# Patient Record
Sex: FEMALE | Race: WHITE | NOT HISPANIC OR LATINO | Employment: FULL TIME | URBAN - NONMETROPOLITAN AREA
[De-identification: names, ages, dates, MRNs, and addresses within clinical notes are randomized per-mention and may not be internally consistent; named-entity substitution may affect disease eponyms.]

---

## 2023-10-03 PROBLEM — R92.8 ABNORMAL MAMMOGRAM: Status: ACTIVE | Noted: 2023-10-03

## 2023-10-03 PROBLEM — R10.2 PELVIC PAIN IN FEMALE: Status: ACTIVE | Noted: 2023-10-03

## 2023-10-03 RX ORDER — LEVOTHYROXINE SODIUM 75 UG/1
TABLET ORAL
COMMUNITY
Start: 2020-02-24

## 2023-10-05 ENCOUNTER — PREP FOR PROCEDURE (OUTPATIENT)
Dept: SURGERY | Facility: HOSPITAL | Age: 53
End: 2023-10-05

## 2023-10-05 ENCOUNTER — OFFICE VISIT (OUTPATIENT)
Dept: SURGERY | Facility: CLINIC | Age: 53
End: 2023-10-05
Payer: COMMERCIAL

## 2023-10-05 VITALS
SYSTOLIC BLOOD PRESSURE: 122 MMHG | BODY MASS INDEX: 28.68 KG/M2 | HEART RATE: 81 BPM | WEIGHT: 178.47 LBS | HEIGHT: 66 IN | DIASTOLIC BLOOD PRESSURE: 74 MMHG

## 2023-10-05 DIAGNOSIS — Z12.11 COLON CANCER SCREENING: Primary | ICD-10-CM

## 2023-10-05 PROCEDURE — 1036F TOBACCO NON-USER: CPT | Performed by: SURGERY

## 2023-10-05 PROCEDURE — 99243 OFF/OP CNSLTJ NEW/EST LOW 30: CPT | Performed by: SURGERY

## 2023-10-05 RX ORDER — ONDANSETRON HYDROCHLORIDE 2 MG/ML
4 INJECTION, SOLUTION INTRAVENOUS ONCE AS NEEDED
Status: CANCELLED | OUTPATIENT
Start: 2023-10-05

## 2023-10-05 RX ORDER — SODIUM CHLORIDE 9 MG/ML
100 INJECTION, SOLUTION INTRAVENOUS CONTINUOUS
Status: CANCELLED | OUTPATIENT
Start: 2023-10-05

## 2023-10-05 NOTE — LETTER
2023     Teddy Huynh MD  [address] [facility] [address]    Patient: Nilda Bonner   YOB: 1970   Date of Visit: 10/5/2023       Dear Dr. Teddy Huynh MD:    Thank you for referring Nilda Bonner to me for evaluation. Below are my notes for this consultation.  If you have questions, please do not hesitate to call me. I look forward to following your patient along with you.       Sincerely,     Gerri Wilde MD      CC: No Recipients  ______________________________________________________________________________________    General Surgery Consultation    Patient: Nilda Bonner  : 1970  MRN: [id number]  Date of Consultation: 10/05/23    Referring Primary Care Provider: Teddy Huynh MD    Chief Complaint: Colon cancer screening    History of Present Illness: Nilda Bonner is a 53 y.o. old female seen at the request of Dr. Huynh for evaluation for colonoscopy.  She had a colonoscopy back in .  At that time, she was occasionally seeing blood per rectum.  To her knowledge, hemorrhoids were found but no other abnormalities.  However, it was an incomplete colonoscopy.  She has daily bowel movements.  These are soft and do not require straining.  She is not on any stool softeners, fiber supplements, or laxatives.  She denies seeing blood in the stool or dark black bowel movements.  She is not on any blood thinners or antiplatelet agents.  She has no family history of colon or rectal cancer.    Medical History:  Vision issues    Surgical History:       Home Medications:  Prior to Admission medications    Medication Sig Start Date End Date Taking? Authorizing Provider   levothyroxine (Synthroid, Levoxyl) 75 mcg tablet Levothyroxine Sodium 75 MCG Oral Tablet   Quantity: 90  Refills: 0        Start : 2020   Active 20   Historical Provider, MD       Allergies:  No Known Allergies    Family History:  "  No family history of colon or rectal cancer.  HTN (mother)  Diabetes (sister)    Social History:  Rare alcohol use, 2-3 times per year.  Non-smoker.  No drug use.    ROS:  Constitutional:  no fever, sweats, and chills  Cardiovascular: No chest pain  Respiratory: No cough or shortness of breath  Gastrointestinal: No change in bowel habits.  No blood in the stool.  No abdominal pain.  Genitourinary: no dysuria  Musculoskeletal: + Joint pain/stiffness, \"my hip is bothering me\"  Integumentary: no rashes  Neurological: no confusion  Endocrine: no heat or cold intolerance  Heme/Lymph: no easy bruising or bleeding    Objective:  /74   Pulse 81   Ht 1.676 m (5' 6\")   Wt 81 kg (178 lb 7.5 oz)   BMI 28.81 kg/m²     Physical Exam:  Constitutional: No acute distress, conversant, pleasant  Neurologic: alert and oriented  Psych: appropriate affect  Ears, Nose, Mouth and Throat: mucus membranes moist  Pulmonary: No labored breathing  Cardiovascular: Regular rate and rhythm  Abdomen: Nondistended, BMI 29  Musculoskeletal: Moves all extremities, no edema  Skin: No jaundice    Labs/imaging:  No pertinent labs or imaging available for review.    Assessment and Plan: Nilda Bonner is a 53 y.o. old female in need of colon cancer screening.  We discussed risks and benefit of colonoscopy.  This included risks of bleeding, perforation, missed polyps, incomplete colonoscopy, and potential need for additional procedures pending findings.  She was agreeable to proceed.  Bowel prep instructions were reviewed with the patient and all of her questions were answered.  She is scheduled for screening colonoscopy on 10/9/23.     Gerri Wilde MD  10/5/2023  "

## 2023-10-05 NOTE — H&P (VIEW-ONLY)
"General Surgery Consultation    Patient: Nilda Bonner  : 1970  MRN: [id number]  Date of Consultation: 10/05/23    Referring Primary Care Provider: Teddy Huynh MD    Chief Complaint: Colon cancer screening    History of Present Illness: Nilda Bonner is a 53 y.o. old female seen at the request of Dr. Huynh for evaluation for colonoscopy.  She had a colonoscopy back in .  At that time, she was occasionally seeing blood per rectum.  To her knowledge, hemorrhoids were found but no other abnormalities.  However, it was an incomplete colonoscopy.  She has daily bowel movements.  These are soft and do not require straining.  She is not on any stool softeners, fiber supplements, or laxatives.  She denies seeing blood in the stool or dark black bowel movements.  She is not on any blood thinners or antiplatelet agents.  She has no family history of colon or rectal cancer.    Medical History:  Vision issues    Surgical History:       Home Medications:  Prior to Admission medications    Medication Sig Start Date End Date Taking? Authorizing Provider   levothyroxine (Synthroid, Levoxyl) 75 mcg tablet Levothyroxine Sodium 75 MCG Oral Tablet   Quantity: 90  Refills: 0        Start : 2020   Active 20   Historical Provider, MD       Allergies:  No Known Allergies    Family History:   No family history of colon or rectal cancer.  HTN (mother)  Diabetes (sister)    Social History:  Rare alcohol use, 2-3 times per year.  Non-smoker.  No drug use.    ROS:  Constitutional:  no fever, sweats, and chills  Cardiovascular: No chest pain  Respiratory: No cough or shortness of breath  Gastrointestinal: No change in bowel habits.  No blood in the stool.  No abdominal pain.  Genitourinary: no dysuria  Musculoskeletal: + Joint pain/stiffness, \"my hip is bothering me\"  Integumentary: no rashes  Neurological: no confusion  Endocrine: no heat or cold intolerance  Heme/Lymph: no easy bruising or " "bleeding    Objective:  /74   Pulse 81   Ht 1.676 m (5' 6\")   Wt 81 kg (178 lb 7.5 oz)   BMI 28.81 kg/m²     Physical Exam:  Constitutional: No acute distress, conversant, pleasant  Neurologic: alert and oriented  Psych: appropriate affect  Ears, Nose, Mouth and Throat: mucus membranes moist  Pulmonary: No labored breathing  Cardiovascular: Regular rate and rhythm  Abdomen: Nondistended, BMI 29  Musculoskeletal: Moves all extremities, no edema  Skin: No jaundice    Labs/imaging:  No pertinent labs or imaging available for review.    Assessment and Plan: Nilda Bonner is a 53 y.o. old female in need of colon cancer screening.  We discussed risks and benefit of colonoscopy.  This included risks of bleeding, perforation, missed polyps, incomplete colonoscopy, and potential need for additional procedures pending findings.  She was agreeable to proceed.  Bowel prep instructions were reviewed with the patient and all of her questions were answered.  She is scheduled for screening colonoscopy on 10/9/23.     Gerri Wilde MD  10/5/2023  "

## 2023-10-05 NOTE — LETTER
2023     Teddy Huynh MD  [address] [facility] [address]    Patient: Nilda Bonner   YOB: 1970   Date of Visit: 10/5/2023       Dear Dr. Teddy Huynh MD:    Thank you for referring Nilda Bonner to me for evaluation. Below are my notes for this consultation.  If you have questions, please do not hesitate to call me. I look forward to following your patient along with you.       Sincerely,     Gerri Wilde MD      CC: No Recipients  ______________________________________________________________________________________    General Surgery Consultation    Patient: Nilda Bonner  : 1970  MRN: [id number]  Date of Consultation: 10/05/23    Referring Primary Care Provider: Teddy Huynh MD    Chief Complaint: Colon cancer screening    History of Present Illness: Nilda Bonner is a 53 y.o. old female seen at the request of Dr. Huynh for evaluation for colonoscopy.     Medical History:  ***    Surgical History:       Home Medications:  Prior to Admission medications    Medication Sig Start Date End Date Taking? Authorizing Provider   levothyroxine (Synthroid, Levoxyl) 75 mcg tablet Levothyroxine Sodium 75 MCG Oral Tablet   Quantity: 90  Refills: 0        Start : 2020   Active 20   Historical Provider, MD       Allergies:  No Known Allergies    Family History:   HTN (mother)  Diabetes (sister)    Social History:  ***    ROS:  Constitutional:  no fever, sweats, and chills  Cardiovascular: No chest pain  Respiratory: No cough or shortness of breath  Gastrointestinal: ***  Genitourinary: no dysuria  Musculoskeletal: no weakness or swelling  Integumentary: no rashes  Neurological: no confusion  Endocrine: no heat or cold intolerance  Heme/Lymph: no easy bruising or bleeding    Objective:  There were no vitals taken for this visit.    Physical Exam:  Constitutional: No acute distress, conversant,  pleasant  Neurologic: alert and oriented  Psych: appropriate affect  Ears, Nose, Mouth and Throat: mucus membranes moist  Pulmonary: No labored breathing  Cardiovascular: Regular rate and rhythm  Abdomen: soft, non-distended, non-tender***  Musculoskeletal: Moves all extremities, no edema  Skin: warm and dry    Labs/imaging:  No pertinent labs or imaging available for review.    Assessment and Plan: Nilda Bonner is a 53 y.o. old female in need of colon cancer screening.  We discussed risks and benefit of colonoscopy.  This included risks of bleeding, perforation, missed polyps, incomplete colonoscopy, and potential need for additional procedures pending findings.  She was agreeable to proceed.  Bowel prep instructions were reviewed with the patient and all of her questions were answered.  She is scheduled for screening colonoscopy on ***.     Gerri Wlide MD  10/5/2023

## 2023-10-05 NOTE — PROGRESS NOTES
"General Surgery Consultation    Patient: Nilda Bonner  : 1970  MRN: [id number]  Date of Consultation: 10/05/23    Referring Primary Care Provider: Teddy Huynh MD    Chief Complaint: Colon cancer screening    History of Present Illness: Nilda Bonner is a 53 y.o. old female seen at the request of Dr. Huynh for evaluation for colonoscopy.  She had a colonoscopy back in .  At that time, she was occasionally seeing blood per rectum.  To her knowledge, hemorrhoids were found but no other abnormalities.  However, it was an incomplete colonoscopy.  She has daily bowel movements.  These are soft and do not require straining.  She is not on any stool softeners, fiber supplements, or laxatives.  She denies seeing blood in the stool or dark black bowel movements.  She is not on any blood thinners or antiplatelet agents.  She has no family history of colon or rectal cancer.    Medical History:  Vision issues    Surgical History:       Home Medications:  Prior to Admission medications    Medication Sig Start Date End Date Taking? Authorizing Provider   levothyroxine (Synthroid, Levoxyl) 75 mcg tablet Levothyroxine Sodium 75 MCG Oral Tablet   Quantity: 90  Refills: 0        Start : 2020   Active 20   Historical Provider, MD       Allergies:  No Known Allergies    Family History:   No family history of colon or rectal cancer.  HTN (mother)  Diabetes (sister)    Social History:  Rare alcohol use, 2-3 times per year.  Non-smoker.  No drug use.    ROS:  Constitutional:  no fever, sweats, and chills  Cardiovascular: No chest pain  Respiratory: No cough or shortness of breath  Gastrointestinal: No change in bowel habits.  No blood in the stool.  No abdominal pain.  Genitourinary: no dysuria  Musculoskeletal: + Joint pain/stiffness, \"my hip is bothering me\"  Integumentary: no rashes  Neurological: no confusion  Endocrine: no heat or cold intolerance  Heme/Lymph: no easy bruising or " "bleeding    Objective:  /74   Pulse 81   Ht 1.676 m (5' 6\")   Wt 81 kg (178 lb 7.5 oz)   BMI 28.81 kg/m²     Physical Exam:  Constitutional: No acute distress, conversant, pleasant  Neurologic: alert and oriented  Psych: appropriate affect  Ears, Nose, Mouth and Throat: mucus membranes moist  Pulmonary: No labored breathing  Cardiovascular: Regular rate and rhythm  Abdomen: Nondistended, BMI 29  Musculoskeletal: Moves all extremities, no edema  Skin: No jaundice    Labs/imaging:  No pertinent labs or imaging available for review.    Assessment and Plan: Nilda Bonner is a 53 y.o. old female in need of colon cancer screening.  We discussed risks and benefit of colonoscopy.  This included risks of bleeding, perforation, missed polyps, incomplete colonoscopy, and potential need for additional procedures pending findings.  She was agreeable to proceed.  Bowel prep instructions were reviewed with the patient and all of her questions were answered.  She is scheduled for screening colonoscopy on 10/9/23.     Gerri Wilde MD  10/5/2023  "

## 2023-10-09 ENCOUNTER — HOSPITAL ENCOUNTER (OUTPATIENT)
Dept: GASTROENTEROLOGY | Facility: CLINIC | Age: 53
Setting detail: OUTPATIENT SURGERY
Discharge: HOME | End: 2023-10-09
Payer: COMMERCIAL

## 2023-10-09 VITALS
BODY MASS INDEX: 27.88 KG/M2 | TEMPERATURE: 97.4 F | OXYGEN SATURATION: 97 % | WEIGHT: 173.5 LBS | HEART RATE: 85 BPM | DIASTOLIC BLOOD PRESSURE: 67 MMHG | SYSTOLIC BLOOD PRESSURE: 103 MMHG | HEIGHT: 66 IN | RESPIRATION RATE: 16 BRPM

## 2023-10-09 DIAGNOSIS — Z12.11 COLON CANCER SCREENING: ICD-10-CM

## 2023-10-09 PROCEDURE — 99153 MOD SED SAME PHYS/QHP EA: CPT | Performed by: SURGERY

## 2023-10-09 PROCEDURE — 7100000009 HC PHASE TWO TIME - INITIAL BASE CHARGE

## 2023-10-09 PROCEDURE — G0121 COLON CA SCRN NOT HI RSK IND: HCPCS | Performed by: SURGERY

## 2023-10-09 PROCEDURE — 7100000010 HC PHASE TWO TIME - EACH INCREMENTAL 1 MINUTE

## 2023-10-09 PROCEDURE — 3700000012 HC SEDATION LEVEL 5+ TIME - INITIAL 15 MINUTES 5/> YEARS

## 2023-10-09 PROCEDURE — 3700000013 HC SEDATION LEVEL 5+ TIME - EACH ADDITIONAL 15 MINUTES

## 2023-10-09 PROCEDURE — 99152 MOD SED SAME PHYS/QHP 5/>YRS: CPT | Performed by: SURGERY

## 2023-10-09 PROCEDURE — 45378 DIAGNOSTIC COLONOSCOPY: CPT | Performed by: SURGERY

## 2023-10-09 PROCEDURE — 2500000004 HC RX 250 GENERAL PHARMACY W/ HCPCS (ALT 636 FOR OP/ED): Performed by: SURGERY

## 2023-10-09 RX ORDER — SODIUM CHLORIDE 9 MG/ML
100 INJECTION, SOLUTION INTRAVENOUS CONTINUOUS
Status: DISCONTINUED | OUTPATIENT
Start: 2023-10-09 | End: 2023-10-20 | Stop reason: HOSPADM

## 2023-10-09 RX ORDER — ACETAMINOPHEN 325 MG/1
325 TABLET ORAL EVERY 6 HOURS PRN
COMMUNITY
End: 2023-11-22 | Stop reason: ALTCHOICE

## 2023-10-09 RX ORDER — FENTANYL CITRATE 50 UG/ML
INJECTION, SOLUTION INTRAMUSCULAR; INTRAVENOUS AS NEEDED
Status: COMPLETED | OUTPATIENT
Start: 2023-10-09 | End: 2023-10-09

## 2023-10-09 RX ORDER — MIDAZOLAM HYDROCHLORIDE 1 MG/ML
INJECTION, SOLUTION INTRAMUSCULAR; INTRAVENOUS AS NEEDED
Status: COMPLETED | OUTPATIENT
Start: 2023-10-09 | End: 2023-10-09

## 2023-10-09 RX ADMIN — FENTANYL CITRATE 25 MCG: 50 INJECTION, SOLUTION INTRAMUSCULAR; INTRAVENOUS at 09:35

## 2023-10-09 RX ADMIN — FENTANYL CITRATE 50 MCG: 50 INJECTION, SOLUTION INTRAMUSCULAR; INTRAVENOUS at 09:27

## 2023-10-09 RX ADMIN — MIDAZOLAM 2 MG: 1 INJECTION INTRAMUSCULAR; INTRAVENOUS at 09:46

## 2023-10-09 RX ADMIN — GLUCAGON HYDROCHLORIDE 1 MG: KIT at 09:29

## 2023-10-09 RX ADMIN — MIDAZOLAM 2 MG: 1 INJECTION INTRAMUSCULAR; INTRAVENOUS at 09:41

## 2023-10-09 RX ADMIN — SODIUM CHLORIDE 100 ML/HR: 9 INJECTION, SOLUTION INTRAVENOUS at 09:15

## 2023-10-09 RX ADMIN — MIDAZOLAM 2 MG: 1 INJECTION INTRAMUSCULAR; INTRAVENOUS at 09:31

## 2023-10-09 RX ADMIN — FENTANYL CITRATE 25 MCG: 50 INJECTION, SOLUTION INTRAMUSCULAR; INTRAVENOUS at 09:33

## 2023-10-09 RX ADMIN — MIDAZOLAM 4 MG: 1 INJECTION INTRAMUSCULAR; INTRAVENOUS at 09:28

## 2023-10-09 ASSESSMENT — PAIN SCALES - GENERAL
PAINLEVEL_OUTOF10: 0 - NO PAIN
PAINLEVEL_OUTOF10: 1
PAINLEVEL_OUTOF10: 3
PAINLEVEL_OUTOF10: 0 - NO PAIN
PAINLEVEL_OUTOF10: 4
PAINLEVEL_OUTOF10: 0 - NO PAIN
PAINLEVEL_OUTOF10: 2
PAINLEVEL_OUTOF10: 0 - NO PAIN
PAINLEVEL_OUTOF10: 3
PAINLEVEL_OUTOF10: 0 - NO PAIN
PAINLEVEL_OUTOF10: 0 - NO PAIN

## 2023-10-09 ASSESSMENT — PAIN - FUNCTIONAL ASSESSMENT: PAIN_FUNCTIONAL_ASSESSMENT: 0-10

## 2023-10-09 NOTE — DISCHARGE INSTRUCTIONS
No driving for 24 hours.  No alcohol for 24 hours.  Do not make any important decisions, or sign any legal documents for 24 hours.      Soft diet today.  May resume regular diet after today.

## 2023-10-10 ENCOUNTER — TELEPHONE (OUTPATIENT)
Dept: SURGERY | Facility: CLINIC | Age: 53
End: 2023-10-10
Payer: COMMERCIAL

## 2023-11-22 ENCOUNTER — OFFICE VISIT (OUTPATIENT)
Dept: OBSTETRICS AND GYNECOLOGY | Facility: CLINIC | Age: 53
End: 2023-11-22
Payer: COMMERCIAL

## 2023-11-22 VITALS
DIASTOLIC BLOOD PRESSURE: 78 MMHG | HEIGHT: 65 IN | SYSTOLIC BLOOD PRESSURE: 118 MMHG | BODY MASS INDEX: 29.76 KG/M2 | WEIGHT: 178.6 LBS

## 2023-11-22 DIAGNOSIS — Z12.4 PAP SMEAR FOR CERVICAL CANCER SCREENING: ICD-10-CM

## 2023-11-22 DIAGNOSIS — Z12.31 ENCOUNTER FOR SCREENING MAMMOGRAM FOR MALIGNANT NEOPLASM OF BREAST: ICD-10-CM

## 2023-11-22 DIAGNOSIS — Z01.419 ENCOUNTER FOR GYNECOLOGICAL EXAMINATION WITHOUT ABNORMAL FINDING: ICD-10-CM

## 2023-11-22 PROCEDURE — 1036F TOBACCO NON-USER: CPT | Performed by: OBSTETRICS & GYNECOLOGY

## 2023-11-22 PROCEDURE — 88175 CYTOPATH C/V AUTO FLUID REDO: CPT

## 2023-11-22 PROCEDURE — 99396 PREV VISIT EST AGE 40-64: CPT | Performed by: OBSTETRICS & GYNECOLOGY

## 2023-11-22 NOTE — PROGRESS NOTES
Nilda Bonner is a 53 y.o. female who is here for a routine exam. PCP = Teddy Huynh MD    Chief Complaint   Patient presents with    Gynecologic Exam     Patient is here for a yearly exam. Patient denies self breast exams. Patient has no concenrs. LMP 2023.        Presents for annual exam. She voices no complaints and is doing well. Denies any bowel or bladder problems. Denies any breast problems.   had a vasectomy    OB History          3    Para   2    Term   1       1    AB   1    Living   2         SAB   1    IAB   0    Ectopic   0    Multiple   0    Live Births   2                  Social History     Substance and Sexual Activity   Sexual Activity Not on file     Current contraception: Vasectomy    Past Medical History:   Diagnosis Date    Encounter for  delivery without indication     Delivery of pregnancy by  section    Encounter for gynecological examination (general) (routine) without abnormal findings     Pap test, as part of routine gynecological examination    Other conditions influencing health status     Menstruation    Personal history of other complications of pregnancy, childbirth and the puerperium     History of     Personal history of other medical treatment     History of mammogram       Past Surgical History:   Procedure Laterality Date    COLONOSCOPY  10/09/2023    Repeat in 10 years, performed by Dr. Wilde    OTHER SURGICAL HISTORY  2020     section       Past med hx and past surg hx reviewed and notable for: none    Review of Systems:   Constitutional: No fever or chills  Respiratory: No shortness of breath, or cough  Cardiovascular: No chest pain or syncope  Breasts: No breast pain, no masses, no nipple discharge  Gastrointestinal: No nausea, vomiting, or diarrhea, no abdominal pain  Genitourinary: No dysuria or frequency  Gynecology: Negative except as noted in history of present illness  All other: All other  "systems reviewed and negative for complaint    Objective   /78   Ht 1.651 m (5' 5\")   Wt 81 kg (178 lb 9.6 oz)   LMP 11/12/2023 (Exact Date)   BMI 29.72 kg/m²     PHYSICAL EXAMINATION:  Well-developed, well nourished, in no acute distress, alert and oriented x three, is pleasant and cooperative.   HEENT: Clear. Pupils equal, round and reactive to light and accommodation. Extraocular muscles are intact. Oral mucosa pink without exudate.   NECK: No lymphadenopathy, no thyromegaly.  BREASTS: Symmetric, no palpable masses. No nipple discharge or retraction.  LUNGS: Clear bilaterally.  HEART: Regular rate and rhythm without murmurs.  ABDOMEN: Normoactive bowel sounds, soft and nontender, no guarding or rebound tenderness, no CVA tenderness.  EXTREMITIES: No clubbing, cyanosis or edema.  NEUROLOGIC:  Cranial nerves II-XII grossly intact.  :  Normal external female genitalia, normal vulva, normal vagina. Normal urethral meatus, urethra and bladder. Normal appearing cervix. Normal-sized uterus, no adnexal masses or tenderness. Pap smear performed today.      Actions performed during this visit include:  - Clinical breast exam  - Clinical pelvic exam  -   Orders Placed This Encounter   Procedures    BI mammo bilateral screening tomosynthesis     Standing Status:   Future     Standing Expiration Date:   1/22/2025     Order Specific Question:   Is the patient pregnant?     Answer:   No     Order Specific Question:   Previous Mamm performed at  location?     Answer:   Yes     Order Specific Question:   Reason for exam:     Answer:   Yearly Screening     Order Specific Question:   Did the patient have a similar exam previously at a location outside of ?     Answer:   No     Order Specific Question:   Radiologist to Determine Optimal Study     Answer:   Yes     Order Specific Question:   Release result to [email or web address]     Answer:   Immediate [1]     Order Specific Question:   Is this exam part of a Research Study? If " Yes, link this order to the research study     Answer:   No        Problem List Items Addressed This Visit    None  Visit Diagnoses       Encounter for gynecological examination without abnormal finding        Relevant Orders    THINPREP PAP    Pap smear for cervical cancer screening        Relevant Orders    THINPREP PAP    Encounter for screening mammogram for malignant neoplasm of breast        Relevant Orders    BI mammo bilateral screening tomosynthesis             Provider Impression:  1.  Annual  2.  Screening mammogram    Thank you for coming to your annual exam. Your findings during the exam were normal.  Please return for your next visit in 1 year.

## 2023-12-09 LAB
CYTOLOGY CMNT CVX/VAG CYTO-IMP: NORMAL
LAB AP HPV GENOTYPE QUESTION: YES
LAB AP HPV HR: NORMAL
LABORATORY COMMENT REPORT: NORMAL
LMP START DATE: NORMAL
PATH REPORT.TOTAL CANCER: NORMAL

## 2023-12-11 ENCOUNTER — TELEPHONE (OUTPATIENT)
Dept: OBSTETRICS AND GYNECOLOGY | Facility: CLINIC | Age: 53
End: 2023-12-11
Payer: COMMERCIAL

## 2023-12-11 NOTE — TELEPHONE ENCOUNTER
----- Message from Boni Tapia MD sent at 12/11/2023  6:52 AM EST -----  Please inform patient of the normal Pap

## 2023-12-27 ENCOUNTER — ANCILLARY PROCEDURE (OUTPATIENT)
Dept: RADIOLOGY | Facility: CLINIC | Age: 53
End: 2023-12-27
Payer: COMMERCIAL

## 2023-12-27 DIAGNOSIS — Z12.31 ENCOUNTER FOR SCREENING MAMMOGRAM FOR MALIGNANT NEOPLASM OF BREAST: ICD-10-CM

## 2023-12-27 PROCEDURE — 77067 SCR MAMMO BI INCL CAD: CPT | Performed by: RADIOLOGY

## 2023-12-27 PROCEDURE — 77067 SCR MAMMO BI INCL CAD: CPT

## 2023-12-27 PROCEDURE — 77063 BREAST TOMOSYNTHESIS BI: CPT | Performed by: RADIOLOGY

## 2024-04-19 ENCOUNTER — TELEPHONE (OUTPATIENT)
Dept: OBSTETRICS AND GYNECOLOGY | Facility: CLINIC | Age: 54
End: 2024-04-19
Payer: COMMERCIAL

## 2024-04-19 NOTE — TELEPHONE ENCOUNTER
Patient called and left a message stating she saw her NP at her PCP office and they told her that her iron is low and was told to call us to let us know that she is having a period every 2 weeks.

## 2024-04-24 ENCOUNTER — OFFICE VISIT (OUTPATIENT)
Dept: OBSTETRICS AND GYNECOLOGY | Facility: CLINIC | Age: 54
End: 2024-04-24
Payer: COMMERCIAL

## 2024-04-24 VITALS
BODY MASS INDEX: 30.49 KG/M2 | WEIGHT: 183 LBS | DIASTOLIC BLOOD PRESSURE: 76 MMHG | SYSTOLIC BLOOD PRESSURE: 118 MMHG | HEIGHT: 65 IN

## 2024-04-24 DIAGNOSIS — N93.9 ABNORMAL UTERINE BLEEDING (AUB): ICD-10-CM

## 2024-04-24 PROCEDURE — 1036F TOBACCO NON-USER: CPT | Performed by: OBSTETRICS & GYNECOLOGY

## 2024-04-24 PROCEDURE — 58100 BIOPSY OF UTERUS LINING: CPT | Performed by: OBSTETRICS & GYNECOLOGY

## 2024-04-24 PROCEDURE — 88305 TISSUE EXAM BY PATHOLOGIST: CPT

## 2024-04-24 PROCEDURE — 88305 TISSUE EXAM BY PATHOLOGIST: CPT | Performed by: STUDENT IN AN ORGANIZED HEALTH CARE EDUCATION/TRAINING PROGRAM

## 2024-04-24 PROCEDURE — 99213 OFFICE O/P EST LOW 20 MIN: CPT | Performed by: OBSTETRICS & GYNECOLOGY

## 2024-04-24 RX ORDER — CHOLECALCIFEROL (VITAMIN D3) 50 MCG
TABLET ORAL
COMMUNITY
Start: 2024-04-18

## 2024-04-24 RX ORDER — FERROUS SULFATE TAB 325 MG (65 MG ELEMENTAL FE) 325 (65 FE) MG
325 TAB ORAL
COMMUNITY
Start: 2024-04-18

## 2024-05-03 LAB
LABORATORY COMMENT REPORT: NORMAL
PATH REPORT.FINAL DX SPEC: NORMAL
PATH REPORT.GROSS SPEC: NORMAL
PATH REPORT.RELEVANT HX SPEC: NORMAL
PATH REPORT.TOTAL CANCER: NORMAL

## 2024-05-07 ENCOUNTER — OFFICE VISIT (OUTPATIENT)
Dept: OBSTETRICS AND GYNECOLOGY | Facility: CLINIC | Age: 54
End: 2024-05-07
Payer: COMMERCIAL

## 2024-05-07 VITALS — HEIGHT: 65 IN | WEIGHT: 187.4 LBS | BODY MASS INDEX: 31.22 KG/M2

## 2024-05-07 DIAGNOSIS — N93.9 ABNORMAL UTERINE BLEEDING (AUB): Primary | ICD-10-CM

## 2024-05-07 PROCEDURE — 99213 OFFICE O/P EST LOW 20 MIN: CPT | Performed by: OBSTETRICS & GYNECOLOGY

## 2024-05-07 PROCEDURE — 1036F TOBACCO NON-USER: CPT | Performed by: OBSTETRICS & GYNECOLOGY

## 2024-05-07 ASSESSMENT — PAIN SCALES - GENERAL: PAINLEVEL: 0-NO PAIN

## 2024-05-07 NOTE — PROGRESS NOTES
Nilda Bonner is a 53 y.o. year old female patient.  PCP = Teddy Huynh MD    Chief Complaint   Patient presents with    Results     Patient is here to go over EMB results.        HPI   Presents to review recent endometrial biopsy due to history of abnormal uterine bleeding.   had a vasectomy.    OB History          3    Para   2    Term   1       1    AB   1    Living   2         SAB   1    IAB   0    Ectopic   0    Multiple   0    Live Births   2                 Past Medical History:   Diagnosis Date    Encounter for  delivery without indication ([facility])     Delivery of pregnancy by  section    Encounter for gynecological examination (general) (routine) without abnormal findings     Pap test, as part of routine gynecological examination    Other conditions influencing health status     Menstruation    Personal history of other complications of pregnancy, childbirth and the puerperium     History of     Personal history of other medical treatment     History of mammogram       Past Surgical History:   Procedure Laterality Date    COLONOSCOPY  10/09/2023    Repeat in 10 years, performed by Dr. Wilde    OTHER SURGICAL HISTORY  2020     section       Review of Systems:   Constitutional: No fever or chills  Respiratory: No shortness of breath, or cough  Cardiovascular: No chest pain or syncope  Breasts: No breast pain, no masses, no nipple discharge  Gastrointestinal: No nausea, vomiting, or diarrhea, no abdominal pain  Genitourinary: No dysuria or frequency  Gynecology: Negative except as noted in history of present illness  All other: All other systems reviewed and negative for complaint    Medication Documentation Review Audit       Reviewed by Boni Tapia MD (Physician) on 24 at 1006      Medication Order Taking? Sig Documenting Provider Last Dose Status   cholecalciferol (Vitamin D-3) 50 MCG (2000) tablet [id number]  TAKE 1 BY  "MOUTH EVERY DAY Historical Provider, MD  Active   FeroSuL tablet [id number]  Take 1 tablet by mouth. Historical Provider, MD  Active   levothyroxine (Synthroid, Levoxyl) 75 mcg tablet [id number] No Levothyroxine Sodium 75 MCG Oral Tablet   Quantity: 90  Refills: 0        Start : 24-Feb-2020   Active Historical Provider, MD 10/8/2023 0800 Active                     Ht 1.651 m (5' 5\")   Wt 85 kg (187 lb 6.4 oz)   LMP 04/12/2024 (Exact Date)   BMI 31.18 kg/m²     PHYSICAL EXAMINATION:  General: No acute distress  Eye: Intraocular movements are intact  HEENT: Normocephalic  Respiratory: Respirations are nonlabored  Gastrointestinal: Nondistended   Musculoskeletal: Normal range of motion  Neurologic: Alert and oriented x3  Psychiatric: Cooperative, appropriate mood and affect.    Case Report   Surgical Pathology                                Case: [id number]                                   Authorizing Provider:  Boni Tapia MD         Collected:           04/24/2024 1153               Ordering Location:     [facility]       Received:            04/24/2024 1153                                      Office Building                                                               Pathologist:           Raheem Day MD                                                           Specimen:    ENDOMETRIUM BIOPSY, EMB                                                                          FINAL DIAGNOSIS   A. Endometrium, biopsy:   --Proliferative pattern endometrium   Electronically signed by Raheem Day MD on 5/3/2024 at 1245      By the signature on this report, the individual or group listed as making the Final Interpretation/Diagnosis certifies that they have reviewed this case.       Clinical History   Abnormal Uterine Bleeding      Gross Description   Received in formalin, labeled with the patient's name and hospital number and \"EMB\", are multiple fragments of soft tissue admixed with mucus and blood " aggregating to 1.9 x 1.6 x 0.3 cm.  The specimen is submitted in toto in one cassette.   SBS       Problem List Items Addressed This Visit    None  Visit Diagnoses       Abnormal uterine bleeding (AUB)    -  Primary             Provider Impression:  1.  Abnormal uterine bleeding  Personally reviewed of the recent endometrial biopsy results which shows proliferative endometrium.  Patient was offered to be placed on hormonal contraceptive such as Seasonale or NuvaRing or the option of cyclic progesterone.  Patient is undecided at this time.  Patient to return later in the year for her annual exam.

## 2024-05-23 ENCOUNTER — TELEPHONE (OUTPATIENT)
Dept: OBSTETRICS AND GYNECOLOGY | Facility: CLINIC | Age: 54
End: 2024-05-23
Payer: COMMERCIAL

## 2024-05-23 DIAGNOSIS — N93.9 ABNORMAL UTERINE BLEEDING (AUB): Primary | ICD-10-CM

## 2024-05-23 RX ORDER — ETONOGESTREL AND ETHINYL ESTRADIOL VAGINAL RING .015; .12 MG/D; MG/D
1 RING VAGINAL
Qty: 1 EACH | Refills: 6 | Status: SHIPPED | OUTPATIENT
Start: 2024-05-23 | End: 2025-05-23

## 2024-05-23 NOTE — TELEPHONE ENCOUNTER
Patient called and stated she discussed with you the Nuva Ring at her last appointment. She stated she is ready to start that. She uses [facility].

## 2024-11-13 ENCOUNTER — HOSPITAL ENCOUNTER (OUTPATIENT)
Dept: RADIOLOGY | Facility: HOSPITAL | Age: 54
Discharge: HOME | End: 2024-11-13
Payer: COMMERCIAL

## 2024-11-13 DIAGNOSIS — M54.50 LOW BACK PAIN, UNSPECIFIED: ICD-10-CM

## 2024-11-13 PROCEDURE — 72100 X-RAY EXAM L-S SPINE 2/3 VWS: CPT

## 2024-11-27 ENCOUNTER — APPOINTMENT (OUTPATIENT)
Dept: OBSTETRICS AND GYNECOLOGY | Facility: CLINIC | Age: 54
End: 2024-11-27
Payer: COMMERCIAL

## 2024-11-27 VITALS
SYSTOLIC BLOOD PRESSURE: 120 MMHG | BODY MASS INDEX: 31.32 KG/M2 | WEIGHT: 188 LBS | HEIGHT: 65 IN | DIASTOLIC BLOOD PRESSURE: 74 MMHG

## 2024-11-27 DIAGNOSIS — Z12.4 ENCOUNTER FOR SCREENING FOR CERVICAL CANCER: ICD-10-CM

## 2024-11-27 DIAGNOSIS — Z12.31 ENCOUNTER FOR SCREENING MAMMOGRAM FOR MALIGNANT NEOPLASM OF BREAST: ICD-10-CM

## 2024-11-27 DIAGNOSIS — Z01.419 ENCOUNTER FOR GYNECOLOGICAL EXAMINATION WITHOUT ABNORMAL FINDING: ICD-10-CM

## 2024-11-27 PROCEDURE — 87624 HPV HI-RISK TYP POOLED RSLT: CPT

## 2024-11-27 PROCEDURE — 88141 CYTOPATH C/V INTERPRET: CPT | Performed by: PATHOLOGY

## 2024-11-27 PROCEDURE — 88175 CYTOPATH C/V AUTO FLUID REDO: CPT

## 2024-11-27 ASSESSMENT — PATIENT HEALTH QUESTIONNAIRE - PHQ9
SUM OF ALL RESPONSES TO PHQ9 QUESTIONS 1 AND 2: 0
2. FEELING DOWN, DEPRESSED OR HOPELESS: NOT AT ALL
1. LITTLE INTEREST OR PLEASURE IN DOING THINGS: NOT AT ALL

## 2024-11-27 ASSESSMENT — PAIN SCALES - GENERAL: PAINLEVEL_OUTOF10: 0-NO PAIN

## 2024-11-27 NOTE — PROGRESS NOTES
Nilda Bonner is a 54 y.o. female who is here for a routine exam. PCP = Teddy Huynh MD    Chief Complaint   Patient presents with    Gynecologic Exam     Patient is here for yearly exam and pap test. Patient does do regular self breast exams and has no concerns at this time. LMP: 10/11/24.          Presents for annual exam. She voices no complaints and is doing well. Denies any bowel or bladder problems. Denies any breast problems.   had vasectomy.  She discontinued the NuvaRing in July.    OB History          3    Para   2    Term   1       1    AB   1    Living   2         SAB   1    IAB   0    Ectopic   0    Multiple   0    Live Births   2                  Social History     Substance and Sexual Activity   Sexual Activity Yes    Partners: Male    Birth control/protection: Male Sterilization     Current contraception: Vasectomy    Past Medical History:   Diagnosis Date    Encounter for  delivery without indication ([facility])     Delivery of pregnancy by  section    Encounter for gynecological examination (general) (routine) without abnormal findings     Pap test, as part of routine gynecological examination    Other conditions influencing health status     Menstruation    Personal history of other complications of pregnancy, childbirth and the puerperium     History of     Personal history of other medical treatment     History of mammogram       Past Surgical History:   Procedure Laterality Date    COLONOSCOPY  10/09/2023    Repeat in 10 years, performed by Dr. Wilde    OTHER SURGICAL HISTORY  2020     section       Past med hx and past surg hx reviewed and notable for: none    Review of Systems:   Constitutional: No fever or chills  Respiratory: No shortness of breath, or cough  Cardiovascular: No chest pain or syncope  Breasts: No breast pain, no masses, no nipple discharge  Gastrointestinal: No nausea, vomiting, or diarrhea, no abdominal  "pain  Genitourinary: No dysuria or frequency  Gynecology: Negative except as noted in history of present illness  All other: All other systems reviewed and negative for complaint    Objective   /74   Ht 1.651 m (5' 5\")   Wt 85.3 kg (188 lb)   LMP 10/11/2024 (Exact Date)   BMI 31.28 kg/m²     PHYSICAL EXAMINATION:  Chaperone present for exam:  Dena Zaldivar MA ([place])  Well-developed, well nourished, in no acute distress, alert and oriented x three, is pleasant and cooperative.   HEENT: Clear. Pupils equal, round and reactive to light and accommodation. Extraocular muscles are intact. Oral mucosa pink without exudate.   NECK: No lymphadenopathy, no thyromegaly.  BREASTS: Symmetric, no palpable masses. No nipple discharge or retraction.  LUNGS: Clear bilaterally.  HEART: Regular rate and rhythm without murmurs.  ABDOMEN: Normoactive bowel sounds, soft and nontender, no guarding or rebound tenderness, no CVA tenderness.  EXTREMITIES: No clubbing, cyanosis or edema.  NEUROLOGIC:  Cranial nerves II-XII grossly intact.  :  Normal external female genitalia, normal vulva, normal vagina. Normal urethral meatus, urethra and bladder. Normal appearing cervix. Normal-sized uterus, no adnexal masses or tenderness. Pap smear performed today.      Actions performed during this visit include:  - Clinical breast exam  - Clinical pelvic exam  -   Orders Placed This Encounter   Procedures    BI mammo bilateral screening tomosynthesis     Standing Status:   Future     Standing Expiration Date:   12/27/2025     Order Specific Question:   Is the patient pregnant?     Answer:   No     Order Specific Question:   Reason for exam:     Answer:   Screening     Order Specific Question:   Radiologist to Determine Optimal Study     Answer:   Yes     Order Specific Question:   [facility]     Answer:   Immediate [1]     Order Specific Question:   Is this exam part of a Research Study? If Yes, link this order to the " research study     Answer:   No        Problem List Items Addressed This Visit    None  Visit Diagnoses       Encounter for screening mammogram for malignant neoplasm of breast        Relevant Orders    BI mammo bilateral screening tomosynthesis    Encounter for gynecological examination without abnormal finding        Relevant Orders    THINPREP PAP TEST    Encounter for screening for cervical cancer        Relevant Orders    THINPREP PAP TEST             Provider Impression:  1.  Annual  2.  Screening mammogram    Thank you for coming to your annual exam. Your findings during the exam were normal.  Please return for your next visit in 1 year.

## 2025-01-08 ENCOUNTER — HOSPITAL ENCOUNTER (OUTPATIENT)
Dept: RADIOLOGY | Facility: HOSPITAL | Age: 55
Discharge: HOME | End: 2025-01-08
Payer: COMMERCIAL

## 2025-01-08 DIAGNOSIS — Z12.31 ENCOUNTER FOR SCREENING MAMMOGRAM FOR MALIGNANT NEOPLASM OF BREAST: ICD-10-CM

## 2025-01-08 PROCEDURE — 77067 SCR MAMMO BI INCL CAD: CPT

## 2025-01-08 PROCEDURE — 77067 SCR MAMMO BI INCL CAD: CPT | Performed by: RADIOLOGY

## 2025-01-08 PROCEDURE — 77063 BREAST TOMOSYNTHESIS BI: CPT | Performed by: RADIOLOGY

## 2025-08-06 ENCOUNTER — TELEPHONE (OUTPATIENT)
Facility: CLINIC | Age: 55
End: 2025-08-06
Payer: COMMERCIAL

## 2025-08-06 DIAGNOSIS — N93.9 ABNORMAL UTERINE BLEEDING: Primary | ICD-10-CM

## 2025-08-06 RX ORDER — LEVONORGESTREL AND ETHINYL ESTRADIOL 0.15-0.03
KIT ORAL
Qty: 84 TABLET | Refills: 0 | Status: SHIPPED | OUTPATIENT
Start: 2025-08-06 | End: 2025-10-11

## 2025-08-06 NOTE — TELEPHONE ENCOUNTER
Patient called and states she is having heavy bleeding. Patient stopped using the Nuva Ring last July and starting on June 12 she had spotting lasting until June 22nd, her period then started 6/23 lasting until 7/4. She then had another period starting July 25 to today. Patient stated that up until June her periods were normal. She has questions and would like to talk to you.

## 2025-12-03 ENCOUNTER — APPOINTMENT (OUTPATIENT)
Dept: OBSTETRICS AND GYNECOLOGY | Facility: CLINIC | Age: 55
End: 2025-12-03
Payer: COMMERCIAL